# Patient Record
Sex: FEMALE | Race: BLACK OR AFRICAN AMERICAN | NOT HISPANIC OR LATINO | Employment: OTHER | URBAN - METROPOLITAN AREA
[De-identification: names, ages, dates, MRNs, and addresses within clinical notes are randomized per-mention and may not be internally consistent; named-entity substitution may affect disease eponyms.]

---

## 2023-03-15 ENCOUNTER — OFFICE VISIT (OUTPATIENT)
Dept: DERMATOLOGY | Facility: CLINIC | Age: 68
End: 2023-03-15
Payer: MEDICARE

## 2023-03-15 DIAGNOSIS — L60.3 ONYCHODYSTROPHY: ICD-10-CM

## 2023-03-15 DIAGNOSIS — L21.9 SEBORRHEIC DERMATITIS: Primary | ICD-10-CM

## 2023-03-15 DIAGNOSIS — L70.0 ACNE COMEDONE: ICD-10-CM

## 2023-03-15 DIAGNOSIS — L65.9 HAIR LOSS: ICD-10-CM

## 2023-03-15 DIAGNOSIS — B35.3 TINEA PEDIS OF BOTH FEET: ICD-10-CM

## 2023-03-15 PROCEDURE — 99999 PR PBB SHADOW E&M-NEW PATIENT-LVL III: CPT | Mod: PBBFAC,,, | Performed by: DERMATOLOGY

## 2023-03-15 PROCEDURE — 99204 PR OFFICE/OUTPT VISIT, NEW, LEVL IV, 45-59 MIN: ICD-10-PCS | Mod: 25,S$PBB,, | Performed by: DERMATOLOGY

## 2023-03-15 PROCEDURE — 99999 PR PBB SHADOW E&M-NEW PATIENT-LVL III: ICD-10-PCS | Mod: PBBFAC,,, | Performed by: DERMATOLOGY

## 2023-03-15 PROCEDURE — 99203 OFFICE O/P NEW LOW 30 MIN: CPT | Mod: PBBFAC | Performed by: DERMATOLOGY

## 2023-03-15 PROCEDURE — 99204 OFFICE O/P NEW MOD 45 MIN: CPT | Mod: 25,S$PBB,, | Performed by: DERMATOLOGY

## 2023-03-15 PROCEDURE — 10040 PR ACNE SURGERY OF SKIN ABSCESS: ICD-10-PCS | Mod: S$PBB,,, | Performed by: DERMATOLOGY

## 2023-03-15 PROCEDURE — 10040 EXTRACTION: CPT | Mod: PBBFAC | Performed by: DERMATOLOGY

## 2023-03-15 PROCEDURE — 10040 EXTRACTION: CPT | Mod: S$PBB,,, | Performed by: DERMATOLOGY

## 2023-03-15 RX ORDER — OMEPRAZOLE 40 MG/1
40 CAPSULE, DELAYED RELEASE ORAL DAILY
COMMUNITY
End: 2023-11-27

## 2023-03-15 RX ORDER — ECONAZOLE NITRATE 10 MG/G
CREAM TOPICAL
Qty: 85 G | Refills: 3 | Status: SHIPPED | OUTPATIENT
Start: 2023-03-15

## 2023-03-15 NOTE — PROGRESS NOTES
Subjective:       Patient ID:  Marissa Winn is a 67 y.o. female who presents for   Chief Complaint   Patient presents with    Seborrheic Dermatitis     Patient c/o itching drying in scalp, face and all over. Using Prednisone.    Spot     C/o black head /spot on left upper chest area.      Hx of seb derm    History of Present Illness: The patient presents with chief complaint of dermatitis.  Location: scalp  Duration: several years  Signs/Symptoms: scaling, dryness    Prior treatments: prednisone, selsen blue    She also c/o hair loss.  On nutrafol and biotin x 6 months.     She also c/o blackhead of the left shoulder.     She also c/o dryness of the feet        Review of Systems   Constitutional:  Negative for fever and chills.   Gastrointestinal:  Negative for nausea and vomiting.   Skin:  Positive for activity-related sunscreen use. Negative for daily sunscreen use and recent sunburn.   Hematologic/Lymphatic: Does not bruise/bleed easily.      Objective:    Physical Exam   Constitutional: She appears well-developed and well-nourished. No distress.   Neurological: She is alert and oriented to person, place, and time. She is not disoriented.   Psychiatric: She has a normal mood and affect.   Skin:   Areas Examined (abnormalities noted in diagram):   Head / Face Inspection Performed  Neck Inspection Performed  Chest / Axilla Inspection Performed  Back Inspection Performed  RUE Inspected  LUE Inspection Performed  Nails and Digits Inspection Performed                     Diagram Legend     Erythematous scaling macule/papule c/w actinic keratosis       Vascular papule c/w angioma      Pigmented verrucoid papule/plaque c/w seborrheic keratosis      Yellow umbilicated papule c/w sebaceous hyperplasia      Irregularly shaped tan macule c/w lentigo     1-2 mm smooth white papules consistent with Milia      Movable subcutaneous cyst with punctum c/w epidermal inclusion cyst      Subcutaneous movable cyst c/w pilar cyst       Firm pink to brown papule c/w dermatofibroma      Pedunculated fleshy papule(s) c/w skin tag(s)      Evenly pigmented macule c/w junctional nevus     Mildly variegated pigmented, slightly irregular-bordered macule c/w mildly atypical nevus      Flesh colored to evenly pigmented papule c/w intradermal nevus       Pink pearly papule/plaque c/w basal cell carcinoma      Erythematous hyperkeratotic cursted plaque c/w SCC      Surgical scar with no sign of skin cancer recurrence      Open and closed comedones      Inflammatory papules and pustules      Verrucoid papule consistent consistent with wart     Erythematous eczematous patches and plaques     Dystrophic onycholytic nail with subungual debris c/w onychomycosis     Umbilicated papule    Erythematous-base heme-crusted tan verrucoid plaque consistent with inflamed seborrheic keratosis     Erythematous Silvery Scaling Plaque c/w Psoriasis     See annotation      Assessment / Plan:        Seborrheic dermatitis  -     ketoconazole (NIZORAL) 2 % shampoo; Wash hair with medicated shampoo at least 1x/week - let sit on scalp at least 5 minutes prior to rinsing  Dispense: 120 mL; Refill: 11  -     discussed dx, will start above meds. Mild.    Hair loss  Mild hair loss, possible female pattern. Discussed OTC biotin and start of hairstim minoxidil.    Tinea pedis of both feet  -     econazole nitrate 1 % cream; AAA of feet bid  Dispense: 85 g; Refill: 3  -     of feet, will start above med.     Onychodystrophy  -     Ambulatory referral/consult to Podiatry; Future; Expected date: 03/22/2023  -     subungual hyperkeratosis, will refer to podiatry for debridement if warranted.     Acne comedone  Incision and expression of comedo contents performed today on the left shoulder with #11 blade and comedo extractor x 1 lesions. No complications.              Follow up in about 4 months (around 7/15/2023).

## 2023-03-16 ENCOUNTER — OFFICE VISIT (OUTPATIENT)
Dept: PODIATRY | Facility: CLINIC | Age: 68
End: 2023-03-16
Payer: MEDICARE

## 2023-03-16 VITALS — WEIGHT: 211.19 LBS | BODY MASS INDEX: 32.01 KG/M2 | HEIGHT: 68 IN

## 2023-03-16 DIAGNOSIS — L60.8: ICD-10-CM

## 2023-03-16 DIAGNOSIS — B35.3 TINEA PEDIS OF BOTH FEET: Primary | ICD-10-CM

## 2023-03-16 PROCEDURE — 99213 OFFICE O/P EST LOW 20 MIN: CPT | Mod: PBBFAC | Performed by: PODIATRIST

## 2023-03-16 PROCEDURE — 99203 PR OFFICE/OUTPT VISIT, NEW, LEVL III, 30-44 MIN: ICD-10-PCS | Mod: S$PBB,,, | Performed by: PODIATRIST

## 2023-03-16 PROCEDURE — 99203 OFFICE O/P NEW LOW 30 MIN: CPT | Mod: S$PBB,,, | Performed by: PODIATRIST

## 2023-03-16 PROCEDURE — 99999 PR PBB SHADOW E&M-EST. PATIENT-LVL III: CPT | Mod: PBBFAC,,, | Performed by: PODIATRIST

## 2023-03-16 PROCEDURE — 99999 PR PBB SHADOW E&M-EST. PATIENT-LVL III: ICD-10-PCS | Mod: PBBFAC,,, | Performed by: PODIATRIST

## 2023-03-16 RX ORDER — KETOCONAZOLE 20 MG/ML
SHAMPOO, SUSPENSION TOPICAL
Qty: 120 ML | Refills: 11 | Status: SHIPPED | OUTPATIENT
Start: 2023-03-16

## 2023-03-16 RX ORDER — TERBINAFINE HYDROCHLORIDE 250 MG/1
250 TABLET ORAL DAILY
Qty: 14 TABLET | Refills: 0 | Status: SHIPPED | OUTPATIENT
Start: 2023-03-16 | End: 2023-04-13 | Stop reason: SDUPTHER

## 2023-03-16 NOTE — PATIENT INSTRUCTIONS
Patient instructed to spray all shoes with Lysol disinfectant spray and let dry before wearing.   Patient instructed to wash all socks in warm water.

## 2023-03-16 NOTE — PROGRESS NOTES
"Subjective:     Patient ID: Marissa Winn is a 67 y.o. female.    Chief Complaint: Fungus (Pt c/o BL foot /nail fungus, pt c/o 0/10 pain,/PCP Dr. Catherine El last seen 2023)    Marissa is a 67 y.o. female who presents to the clinic complaining of growth of skin under left big toenail and nail fungus and skin fungus. Patient denies any pain to the toenail. Patient admits treatment for fungus years ago. Patient has no other pedal complaints at this time.     Referring Provider: Adina Ledesma MD    Patient Active Problem List   Diagnosis    Hypertension       Medication List with Changes/Refills   New Medications    TERBINAFINE HCL (LAMISIL) 250 MG TABLET    Take 1 tablet (250 mg total) by mouth once daily.   Current Medications    CETIRIZINE (ZYRTEC) 10 MG TABLET    Take 10 mg by mouth once daily.    ECONAZOLE NITRATE 1 % CREAM    AAA of feet bid    HYDROCHLOROTHIAZIDE (HYDRODIURIL) 25 MG TABLET    Take 25 mg by mouth once daily.    KETOCONAZOLE (NIZORAL) 2 % SHAMPOO    Wash hair with medicated shampoo at least 1x/week - let sit on scalp at least 5 minutes prior to rinsing    METFORMIN (GLUCOPHAGE) 500 MG TABLET    Take 500 mg by mouth daily with breakfast.    OMEPRAZOLE (PRILOSEC) 40 MG CAPSULE    Take 40 mg by mouth once daily.    PANTOPRAZOLE SODIUM (PANTOPRAZOLE ORAL)    Take by mouth.       Review of patient's allergies indicates:  No Known Allergies    History reviewed. No pertinent surgical history.    History reviewed. No pertinent family history.    Social History     Socioeconomic History    Marital status:    Tobacco Use    Smoking status: Never    Smokeless tobacco: Never       Vitals:    03/16/23 1448   Weight: 95.8 kg (211 lb 3.2 oz)   Height: 5' 7.5" (1.715 m)   PainSc: 0-No pain       Review of Systems   Constitutional:  Negative for chills and fever.   Cardiovascular: Negative.    Gastrointestinal:  Negative for diarrhea, nausea and vomiting.   Skin:  Positive for rash.   Neurological: Negative.  "   Endo/Heme/Allergies: Negative.    Psychiatric/Behavioral: Negative.             Objective:      PHYSICAL EXAM: Apperance: Alert and orient in no distress,well developed, and with good attention to grooming and body habits  Lower Extremity Exam  VASCULAR: Dorsalis pedis pulses 2/4 bilateral and Posterior Tibial pulses 2/4 bilateral. Capillary fill time <4 seconds bilateral. No edema observed bilateral. Varicosities absent bilateral. Skin temperature of the lower extremities is warm to warm, proximal to distal. Hair growth WNL bilateral.  DERMATOLOGICAL: No skin rash, subcutaneous nodules, lesions or ulcers observed. Dry and scaly skin noted plantarly bilateral(L>R) in moccasin distribution. Webspaces 1,2,3,4 bilateral are clean, dry and without evidence of break in skin integrity. Minimal overgrowth noted to distal left 1st and 2nd toes.   NEUROLOGICAL: Light touch, sharp-dull, proprioception all present and equal bilaterally.    MUSCULOSKELETAL: Muscle strength is 5/5 for foot inverters, everters, plantarflexors, and dorsiflexors. Muscle tone is normal.         Assessment:       ICD-10-CM ICD-9-CM   1. Tinea pedis of both feet  B35.3 110.4   2. Acquired pterygium of nail - Left Foot  L60.8 703.8       Plan:   Tinea pedis of both feet  -     terbinafine HCL (LAMISIL) 250 mg tablet; Take 1 tablet (250 mg total) by mouth once daily.  Dispense: 14 tablet; Refill: 0    Acquired pterygium of nail - Left Foot  -     Ambulatory referral/consult to Podiatry      I counseled the patient on her conditions, regarding findings of my examination, my impressions, and usual treatment plan.   Discuss treatment options for tinea pedis.  I explained that fungus lives in a warm dark moist environment and therefore patient should make every attempt to keep feet clean and dry.  We discussed drying feet thoroughly after shower particularly between the toes.   Patient instructed to spray all shoes with Lysol disinfectant spray and let  dry before wearing. Patient instructed to wash all socks in hot water and bleach.  We discussed using Lamisil for tinea pedis. This drug offers a fairly high but not universal cure rate. A 2-4 week treatment course is recommended. The patient is aware that rare cases of liver injury have been reported; and agrees to have a liver function tests performed. The symptoms of liver disease have been discussed; call if such occurs. Other side effects, such as headaches and rashes, have also been discussed.  Prescribed Lamisil 250mg to be taken once daily with food. Patient was instructed on dosing information. Discontinue if adverse effects occur   Patient instructed to apply Econazole cream to plantar feet once daily.   Patient to return in 4 weeks or sooner if needed.          Haroldo Gibson DPM  [facility]

## 2023-03-24 ENCOUNTER — TELEPHONE (OUTPATIENT)
Dept: DERMATOLOGY | Facility: CLINIC | Age: 68
End: 2023-03-24
Payer: MEDICARE

## 2023-03-24 NOTE — TELEPHONE ENCOUNTER
Attempted to call patient to inform them about Dr. Ledesma sending over hairstim minoxidil; no answer at this time. LVM instructing patient to call back.     ----- Message from Adina Ledesma MD sent at 3/23/2023  6:07 PM CDT -----  Will send hair stim minoxidil, they will contact pt directly.  ----- Message -----  From: Heidy Oliver MA  Sent: 3/23/2023   2:22 PM CDT  To: Adina Ledesma MD    I am unsure of which hair loss company.  ----- Message -----  From: Rohan Bunn  Sent: 3/23/2023   1:49 PM CDT  To: [facility] Staff    Pt is calling in regards to consulting with a nurse    She stated she was told about a hair loss company by the office last visit and she needed more info      Pls call her back at [phone]        Thank you    Tammy

## 2023-03-24 NOTE — TELEPHONE ENCOUNTER
Spoke with patient, msg was sent to Dr. Ledesma.    ---- Message from Maame Gore sent at 3/24/2023 10:30 AM CDT -----  Contact: [phone]  Pt is requesting a call in regards to medication. Pt stated that the company for the hair loss/growth has never contacted her and she is needing to know how to proceed. Please call pt back at [phone]. Thanks KB

## 2023-03-27 ENCOUNTER — TELEPHONE (OUTPATIENT)
Dept: DERMATOLOGY | Facility: CLINIC | Age: 68
End: 2023-03-27
Payer: MEDICARE

## 2023-03-28 ENCOUNTER — TELEPHONE (OUTPATIENT)
Dept: DERMATOLOGY | Facility: CLINIC | Age: 68
End: 2023-03-28
Payer: MEDICARE

## 2023-03-28 NOTE — TELEPHONE ENCOUNTER
Returned pt call. Pt informed that medication was sent on 3/23 and that they do not have an email but will try and call her mobile number. Verified number with pt. Pt also given email for company to reach out. She reports not receiving any call yet.   ----- Message from Natividad Boston sent at 3/27/2023  5:13 PM CDT -----  Type: General Call Back     Name of Caller:NENA PERRIN [[id number]]  Symptoms: missed call back   Would the patient rather a call back or a response via [facility]? Call back   Best Call Back Number:[phone]  Additional Information: Patient indicates she would like to receive a call back with more information regarding the missed call. Patient indicates she does not know what she was trying to be contacted for and would like to know what it was regarding. Please call patient back with further assistance and more information.

## 2023-04-13 ENCOUNTER — OFFICE VISIT (OUTPATIENT)
Dept: PODIATRY | Facility: CLINIC | Age: 68
End: 2023-04-13
Payer: MEDICARE

## 2023-04-13 ENCOUNTER — HOSPITAL ENCOUNTER (OUTPATIENT)
Dept: RADIOLOGY | Facility: HOSPITAL | Age: 68
Discharge: HOME | End: 2023-04-13
Attending: PODIATRIST
Payer: MEDICARE

## 2023-04-13 VITALS — HEIGHT: 67 IN | WEIGHT: 211 LBS | BODY MASS INDEX: 33.12 KG/M2

## 2023-04-13 DIAGNOSIS — M72.2 PLANTAR FASCIITIS: ICD-10-CM

## 2023-04-13 DIAGNOSIS — M79.672 LEFT FOOT PAIN: ICD-10-CM

## 2023-04-13 DIAGNOSIS — B35.3 TINEA PEDIS OF BOTH FEET: Primary | ICD-10-CM

## 2023-04-13 PROCEDURE — 99213 PR OFFICE/OUTPT VISIT, EST, LEVL III, 20-29 MIN: ICD-10-PCS | Mod: S$PBB,,, | Performed by: PODIATRIST

## 2023-04-13 PROCEDURE — 99999 PR PBB SHADOW E&M-EST. PATIENT-LVL III: ICD-10-PCS | Mod: PBBFAC,,, | Performed by: PODIATRIST

## 2023-04-13 PROCEDURE — 73630 X-RAY EXAM OF FOOT: CPT | Mod: 26,50,, | Performed by: RADIOLOGY

## 2023-04-13 PROCEDURE — 99213 OFFICE O/P EST LOW 20 MIN: CPT | Mod: S$PBB,,, | Performed by: PODIATRIST

## 2023-04-13 PROCEDURE — 73630 XR FOOT COMPLETE 3 VIEW BILATERAL: ICD-10-PCS | Mod: 26,50,, | Performed by: RADIOLOGY

## 2023-04-13 PROCEDURE — 99999 PR PBB SHADOW E&M-EST. PATIENT-LVL III: CPT | Mod: PBBFAC,,, | Performed by: PODIATRIST

## 2023-04-13 PROCEDURE — 73630 X-RAY EXAM OF FOOT: CPT | Mod: TC,50

## 2023-04-13 PROCEDURE — 99213 OFFICE O/P EST LOW 20 MIN: CPT | Mod: PBBFAC | Performed by: PODIATRIST

## 2023-04-13 RX ORDER — TERBINAFINE HYDROCHLORIDE 250 MG/1
250 TABLET ORAL DAILY
Qty: 14 TABLET | Refills: 0 | Status: SHIPPED | OUTPATIENT
Start: 2023-04-13 | End: 2023-11-27

## 2023-04-13 NOTE — PROGRESS NOTES
Subjective:     Patient ID: Marissa Winn is a 67 y.o. female.    Chief Complaint: Follow-up (Follow up on fungus, diabetic pt, no pain, pt is wearing slippers, Pt was last seen in Dec. By PCP Dr. Catherine El )    Marissa is a 67 y.o. female who presents to the clinic for follow up of fungus. Patient states the oral medication helped immediately. Patient states she started the cream and it helps also. Patient also complains of right heel pain that comes and goes. Patient also complains of a stress fracture in the left foot from last year that sometimes hurts.  Patient has no other pedal complaints at this time.       Patient Active Problem List   Diagnosis    Hypertension       Medication List with Changes/Refills   Current Medications    CETIRIZINE (ZYRTEC) 10 MG TABLET    Take 10 mg by mouth once daily.    ECONAZOLE NITRATE 1 % CREAM    AAA of feet bid    HYDROCHLOROTHIAZIDE (HYDRODIURIL) 25 MG TABLET    Take 25 mg by mouth once daily.    KETOCONAZOLE (NIZORAL) 2 % SHAMPOO    Wash hair with medicated shampoo at least 1x/week - let sit on scalp at least 5 minutes prior to rinsing    METFORMIN (GLUCOPHAGE) 500 MG TABLET    Take 500 mg by mouth daily with breakfast.    OMEPRAZOLE (PRILOSEC) 40 MG CAPSULE    Take 40 mg by mouth once daily.    PANTOPRAZOLE SODIUM (PANTOPRAZOLE ORAL)    Take by mouth.   Changed and/or Refilled Medications    Modified Medication Previous Medication    TERBINAFINE HCL (LAMISIL) 250 MG TABLET terbinafine HCL (LAMISIL) 250 mg tablet       Take 1 tablet (250 mg total) by mouth once daily.    Take 1 tablet (250 mg total) by mouth once daily.       Review of patient's allergies indicates:  No Known Allergies    History reviewed. No pertinent surgical history.    History reviewed. No pertinent family history.    Social History     Socioeconomic History    Marital status:    Tobacco Use    Smoking status: Never    Smokeless tobacco: Never       Vitals:    04/13/23 1039   Weight: 95.7 kg  "(211 lb)   Height: 5' 7" (1.702 m)   PainSc: 0-No pain       Review of Systems   Constitutional:  Negative for chills and fever.   Cardiovascular: Negative.    Gastrointestinal:  Negative for diarrhea, nausea and vomiting.   Skin:  Positive for rash.   Neurological: Negative.    Endo/Heme/Allergies: Negative.    Psychiatric/Behavioral: Negative.             Objective:      PHYSICAL EXAM: Apperance: Alert and orient in no distress,well developed, and with good attention to grooming and body habits  Lower Extremity Exam  VASCULAR: Dorsalis pedis pulses 2/4 bilateral and Posterior Tibial pulses 2/4 bilateral. Capillary fill time <4 seconds bilateral. No edema observed bilateral. Varicosities absent bilateral. Skin temperature of the lower extremities is warm to warm, proximal to distal. Hair growth WNL bilateral.  DERMATOLOGICAL: No skin rash, subcutaneous nodules, lesions or ulcers observed. Minimal dry and scaly skin noted plantarly bilateral(L>R) in moccasin distribution. Webspaces 1,2,3,4 bilateral are clean, dry and without evidence of break in skin integrity. Minimal overgrowth noted to distal left 1st and 2nd toes.   NEUROLOGICAL: Light touch, sharp-dull, proprioception all present and equal bilaterally.    MUSCULOSKELETAL: Muscle strength is 5/5 for foot inverters, everters, plantarflexors, and dorsiflexors. Muscle tone is normal. Ankle joints right shows decreased ROM. right ankle ROM shows greater decrease in dorsiflexion with knee extended. Ankle joint ROM is pain free and without crepitus right. No pain to palpation right plantar medial tubercle. Plantar medial aspect of right heels shows tenderness to palpation. No pain on medial-lateral compression of the calcaneus.         Assessment:       ICD-10-CM ICD-9-CM   1. Tinea pedis of both feet  B35.3 110.4   2. Plantar fasciitis - Right Foot  M72.2 728.71   3. Left foot pain - Left Foot  M79.672 729.5         Plan:   Tinea pedis of both feet  -     " terbinafine HCL (LAMISIL) 250 mg tablet; Take 1 tablet (250 mg total) by mouth once daily.  Dispense: 14 tablet; Refill: 0    Plantar fasciitis - Right Foot  -     X-Ray Foot Complete Bilateral; Future; Expected date: 04/13/2023    Left foot pain - Left Foot  -     X-Ray Foot Complete Bilateral; Future; Expected date: 04/13/2023        I counseled the patient on her conditions, regarding findings of my examination, my impressions, and usual treatment plan.   Discuss treatment options for tinea pedis.  I explained that fungus lives in a warm dark moist environment and therefore patient should make every attempt to keep feet clean and dry.  We discussed drying feet thoroughly after shower particularly between the toes.   Patient instructed to spray all shoes with Lysol disinfectant spray and let dry before wearing. Patient instructed to wash all socks in hot water and bleach.  We discussed using Lamisil for tinea pedis. This drug offers a fairly high but not universal cure rate. A 2-4 week treatment course is recommended. The patient is aware that rare cases of liver injury have been reported; and agrees to have a liver function tests performed. The symptoms of liver disease have been discussed; call if such occurs. Other side effects, such as headaches and rashes, have also been discussed.  Prescribed Lamisil 250mg to be taken once daily with food. Patient was instructed on dosing information. Discontinue if adverse effects occur   Patient instructed to apply Econazole cream to plantar feet once daily.   Ordered bilateral foot x-rays to be completed today.   I explained to the patient that etiology and treatment options for heel pain including rest,  ice messages, stretching exercises, strappings/tappings, NSAID's, injections, new shoegear with orthotic inserts, and/or surgical treatment.   Patient agreed to stretching exercises.   I gave written and verbal instructions on heel cord stretching and this was  demonstrated for the patient. Patient expressed understanding.  The patient and I reviewed the types of shoes she should be wearing, my recommendation includes generally the best time of the day for a shoe fitting is the afternoon, shoes with a wide toe box, very good cushion, and tennis shoes with removable inner soles. The patient and I reviewed my recommendations for over-the-counter orthotic inserts.   Patient to return as needed.          Haroldo Gibson DPM  [facility]

## 2023-06-15 ENCOUNTER — OFFICE VISIT (OUTPATIENT)
Dept: DERMATOLOGY | Facility: CLINIC | Age: 68
End: 2023-06-15
Payer: MEDICARE

## 2023-06-15 VITALS — BODY MASS INDEX: 33.12 KG/M2 | HEIGHT: 67 IN | WEIGHT: 211 LBS

## 2023-06-15 DIAGNOSIS — L65.9 HAIR LOSS: Primary | ICD-10-CM

## 2023-06-15 DIAGNOSIS — L65.8 TRACTION ALOPECIA: ICD-10-CM

## 2023-06-15 PROCEDURE — 99214 OFFICE O/P EST MOD 30 MIN: CPT | Mod: S$PBB,,, | Performed by: DERMATOLOGY

## 2023-06-15 PROCEDURE — 99999 PR PBB SHADOW E&M-EST. PATIENT-LVL III: CPT | Mod: PBBFAC,,, | Performed by: DERMATOLOGY

## 2023-06-15 PROCEDURE — 99214 PR OFFICE/OUTPT VISIT, EST, LEVL IV, 30-39 MIN: ICD-10-PCS | Mod: S$PBB,,, | Performed by: DERMATOLOGY

## 2023-06-15 PROCEDURE — 99999 PR PBB SHADOW E&M-EST. PATIENT-LVL III: ICD-10-PCS | Mod: PBBFAC,,, | Performed by: DERMATOLOGY

## 2023-06-15 PROCEDURE — 99213 OFFICE O/P EST LOW 20 MIN: CPT | Mod: PBBFAC | Performed by: DERMATOLOGY

## 2023-06-15 RX ORDER — FLUOCINOLONE ACETONIDE 0.1 MG/ML
SOLUTION TOPICAL
Qty: 60 ML | Refills: 5 | Status: SHIPPED | OUTPATIENT
Start: 2023-06-15

## 2023-06-15 NOTE — PROGRESS NOTES
Subjective:      Patient ID:  Marissa Winn is a 68 y.o. female who presents for   Chief Complaint   Patient presents with    Follow-up    Hair Loss     Hx of seb derm, hair loss, onychodystrophy, and tinea pedis, last seen on 3/15/23.She states scalp did not improve with ketoconazole shampoo, restarted OTC nizoral.  She c/o increased shedding of the frontal hairline.  Did not start hairstim product.       Review of Systems   Constitutional:  Negative for fever and chills.   Gastrointestinal:  Negative for nausea and vomiting.   Skin:  Positive for activity-related sunscreen use. Negative for daily sunscreen use and recent sunburn.   Hematologic/Lymphatic: Does not bruise/bleed easily.     Objective:   Physical Exam   Constitutional: She appears well-developed and well-nourished. No distress.   Neurological: She is alert and oriented to person, place, and time. She is not disoriented.   Psychiatric: She has a normal mood and affect.   Skin:   Areas Examined (abnormalities noted in diagram):   Scalp / Hair Palpated and Inspected  Head / Face Inspection Performed  Neck Inspection Performed  RUE Inspected  LUE Inspection Performed  Nails and Digits Inspection Performed            Assessment / Plan:        Hair loss  Traction alopecia  -     fluocinolone (SYNALAR) 0.01 % external solution; Use once daily on scalp  Dispense: 60 mL; Refill: 5  -     mild to moderate thinning noted.  Will start hairstim minoxidil/finasteride. Will adda brady med, continue nutrafol daily.              Follow up in about 3 months (around 9/15/2023).

## 2023-11-27 ENCOUNTER — OFFICE VISIT (OUTPATIENT)
Dept: PODIATRY | Facility: CLINIC | Age: 68
End: 2023-11-27
Payer: MEDICARE

## 2023-11-27 VITALS — BODY MASS INDEX: 32.96 KG/M2 | WEIGHT: 210 LBS | HEIGHT: 67 IN

## 2023-11-27 DIAGNOSIS — L60.0 ONYCHOCRYPTOSIS: Primary | ICD-10-CM

## 2023-11-27 PROCEDURE — 99213 OFFICE O/P EST LOW 20 MIN: CPT | Mod: PBBFAC | Performed by: PODIATRIST

## 2023-11-27 PROCEDURE — 99999 PR PBB SHADOW E&M-EST. PATIENT-LVL III: CPT | Mod: PBBFAC,,, | Performed by: PODIATRIST

## 2023-11-27 PROCEDURE — 99999 PR PBB SHADOW E&M-EST. PATIENT-LVL III: ICD-10-PCS | Mod: PBBFAC,,, | Performed by: PODIATRIST

## 2023-11-27 PROCEDURE — 99213 PR OFFICE/OUTPT VISIT, EST, LEVL III, 20-29 MIN: ICD-10-PCS | Mod: S$PBB,,, | Performed by: PODIATRIST

## 2023-11-27 PROCEDURE — 99213 OFFICE O/P EST LOW 20 MIN: CPT | Mod: S$PBB,,, | Performed by: PODIATRIST

## 2023-11-27 RX ORDER — IRBESARTAN 75 MG/1
75 TABLET ORAL
COMMUNITY

## 2023-11-27 RX ORDER — GABAPENTIN 100 MG/1
200 CAPSULE ORAL
COMMUNITY
Start: 2023-07-14

## 2023-11-27 RX ORDER — EMPAGLIFLOZIN 25 MG/1
1 TABLET, FILM COATED ORAL EVERY MORNING
COMMUNITY
Start: 2023-07-14

## 2023-11-27 RX ORDER — VALACYCLOVIR HYDROCHLORIDE 500 MG/1
500 TABLET, FILM COATED ORAL 2 TIMES DAILY
COMMUNITY

## 2023-11-27 RX ORDER — TIRZEPATIDE 2.5 MG/.5ML
2.5 INJECTION, SOLUTION SUBCUTANEOUS
COMMUNITY
Start: 2023-11-06

## 2023-11-27 NOTE — PROGRESS NOTES
Subjective:     Patient ID: Marissa Winn is a 68 y.o. female.    Chief Complaint: Foot Pain (Left great toe pain, she states pain on the medial side of toe, pt states the the toe has been sensitive on that side for years but, now its progress on the pain like on and off, pt rates pain 1/10, pt is non-diabetic)    Marissa is a 68 y.o. female who presents to the clinic complaining of painful ingrown toenail on the left foot. Patient points to left medial hallux nail. Patient states pain has been on and off for years. Patient rates pain 1/10. Patient has no other pedal complaint at this time.     Patient Active Problem List   Diagnosis    Hypertension       Medication List with Changes/Refills   Current Medications    CETIRIZINE (ZYRTEC) 10 MG TABLET    Take 10 mg by mouth once daily.    ECONAZOLE NITRATE 1 % CREAM    AAA of feet bid    FLUOCINOLONE (SYNALAR) 0.01 % EXTERNAL SOLUTION    Use once daily on scalp    GABAPENTIN (NEURONTIN) 100 MG CAPSULE    Take 200 mg by mouth.    HYDROCHLOROTHIAZIDE (HYDRODIURIL) 25 MG TABLET    Take 25 mg by mouth once daily.    IRBESARTAN (AVAPRO) 75 MG TABLET    Take 75 mg by mouth.    JARDIANCE 25 MG TABLET    Take 1 tablet by mouth every morning.    KETOCONAZOLE (NIZORAL) 2 % SHAMPOO    Wash hair with medicated shampoo at least 1x/week - let sit on scalp at least 5 minutes prior to rinsing    METFORMIN (GLUCOPHAGE) 500 MG TABLET    Take 500 mg by mouth daily with breakfast.    MOUNJARO 2.5 MG/0.5 ML PNIJ    Inject 2.5 mg into the skin.    VALACYCLOVIR (VALTREX) 500 MG TABLET    Take 500 mg by mouth 2 (two) times daily.   Discontinued Medications    OMEPRAZOLE (PRILOSEC) 40 MG CAPSULE    Take 40 mg by mouth once daily.    PANTOPRAZOLE SODIUM (PANTOPRAZOLE ORAL)    Take by mouth.    TERBINAFINE HCL (LAMISIL) 250 MG TABLET    Take 1 tablet (250 mg total) by mouth once daily.       Review of patient's allergies indicates:  No Known Allergies    History reviewed. No pertinent surgical  "history.    History reviewed. No pertinent family history.    Social History     Socioeconomic History    Marital status:    Tobacco Use    Smoking status: Never    Smokeless tobacco: Never       Vitals:    11/27/23 1051   Weight: 95.3 kg (210 lb)   Height: 5' 7" (1.702 m)   PainSc:   1       Review of Systems   Constitutional:  Negative for chills and fever.   Respiratory:  Negative for shortness of breath.    Cardiovascular:  Negative for chest pain, palpitations, orthopnea, claudication and leg swelling.   Gastrointestinal:  Negative for diarrhea, nausea and vomiting.   Musculoskeletal:  Negative for joint pain.   Skin:  Negative for rash.   Neurological:  Negative for dizziness, tingling, sensory change, focal weakness and weakness.   Psychiatric/Behavioral: Negative.           Objective:      PHYSICAL EXAM: Apperance: Alert and orient in no distress,well developed, and with good attention to grooming and body habits  Lower Extremity Exam   VASCULAR: Dorsalis pedis pulses 2/4 bilateral and Posterior Tibial pulses 2/4 bilateral.   DERMATOLOGICAL: No skin rash, subcutaneous nodules, lesions or ulcers observed. Left hallux nail observed to be mildly incurvated at  distal medial  borders and slight obstructed in the nail grooves with soft tissue. No purulent drainage, no odor, and no increased temperature observed to left hallux.    NEUROLOGICAL: Light touch, sharp-dull, proprioception all present and equal bilaterally.    MUSCULOSKELETAL: Muscle strength 5/5 for all foot inverters, everters, plantarflexors, and  dorsiflexors bilateral. Pain on palpation of left hallux  distal medial  nail border. No pain on palpation of dorsal nail plate left hallux.          Assessment:       ICD-10-CM ICD-9-CM   1. Onychocryptosis - Left Foot  L60.0 703.0       Plan:   Onychocryptosis - Left Foot      I counseled the patient on her conditions, regarding findings of my examination, my impressions, and usual treatment plan. "   Conservatively we did discuss proper nail cutting for incurvated toenails. Surgically we briefly discussed temporary vs. permanent nail avulsion procedures with patient. The patient elects for conservative management at this time.   Patient to return as needed.          Haroldo Gibson DPM  [facility]

## 2024-01-03 ENCOUNTER — TELEPHONE (OUTPATIENT)
Dept: PODIATRY | Facility: CLINIC | Age: 69
End: 2024-01-03
Payer: MEDICARE

## 2024-01-03 NOTE — TELEPHONE ENCOUNTER
----- Message from Miranda Isaac sent at 1/2/2024  4:15 PM CST -----  Name of Who is Calling:pt           What is the request in detail:requesting a call to check for sooner availability           Can the clinic reply by [facility]:  no         What Number to Call Back if not in [facility]: [phone]

## 2024-01-23 ENCOUNTER — OFFICE VISIT (OUTPATIENT)
Dept: PODIATRY | Facility: CLINIC | Age: 69
End: 2024-01-23
Payer: MEDICARE

## 2024-01-23 ENCOUNTER — PATIENT MESSAGE (OUTPATIENT)
Dept: PODIATRY | Facility: CLINIC | Age: 69
End: 2024-01-23

## 2024-01-23 VITALS — BODY MASS INDEX: 32.98 KG/M2 | WEIGHT: 210.13 LBS | HEIGHT: 67 IN

## 2024-01-23 DIAGNOSIS — M19.072 OSTEOARTHRITIS OF LEFT ANKLE AND FOOT: Primary | ICD-10-CM

## 2024-01-23 PROCEDURE — 99213 OFFICE O/P EST LOW 20 MIN: CPT | Mod: 25,S$PBB,, | Performed by: PODIATRIST

## 2024-01-23 PROCEDURE — 20605 DRAIN/INJ JOINT/BURSA W/O US: CPT | Mod: PBBFAC,LT | Performed by: PODIATRIST

## 2024-01-23 PROCEDURE — 99999 PR PBB SHADOW E&M-EST. PATIENT-LVL III: CPT | Mod: PBBFAC,,, | Performed by: PODIATRIST

## 2024-01-23 PROCEDURE — 99999PBSHW PR PBB SHADOW TECHNICAL ONLY FILED TO HB: Mod: PBBFAC,,,

## 2024-01-23 PROCEDURE — 99213 OFFICE O/P EST LOW 20 MIN: CPT | Mod: PBBFAC,25 | Performed by: PODIATRIST

## 2024-01-23 PROCEDURE — 20605 DRAIN/INJ JOINT/BURSA W/O US: CPT | Mod: S$PBB,LT,, | Performed by: PODIATRIST

## 2024-01-23 RX ADMIN — TRIAMCINOLONE ACETONIDE 40 MG: 200 INJECTION, SUSPENSION INTRA-ARTICULAR; INTRAMUSCULAR at 02:01

## 2024-01-23 NOTE — PROGRESS NOTES
Subjective:     Patient ID: Marissa Winn is a 68 y.o. female.    Chief Complaint: Foot Pain (C/o states left foot pain on top of foot,hurts more when walking, started 4 weeks ago, rates pain 7/10, wearing casual shoes, diabetic pt, last seen 11/06/23. )    Marissa is a 68 y.o. female who presents to the podiatry clinic  with complaint of  left foot pain. Onset of the symptoms was several weeks ago. Precipitating event: none known. Current symptoms include: ability to bear weight, but with some pain and stiffness. Aggravating factors: walking. Symptoms have been intermittent. Patient has had prior foot problems. Evaluation to date: none. Treatment to date: rest. Patients rates pain 7/10 on pain scale. Patient points to left dorsal midfoot.    Patient Active Problem List   Diagnosis    Hypertension       Medication List with Changes/Refills   Current Medications    CETIRIZINE (ZYRTEC) 10 MG TABLET    Take 10 mg by mouth once daily.    ECONAZOLE NITRATE 1 % CREAM    AAA of feet bid    FLUOCINOLONE (SYNALAR) 0.01 % EXTERNAL SOLUTION    Use once daily on scalp    GABAPENTIN (NEURONTIN) 100 MG CAPSULE    Take 200 mg by mouth.    HYDROCHLOROTHIAZIDE (HYDRODIURIL) 25 MG TABLET    Take 25 mg by mouth once daily.    IRBESARTAN (AVAPRO) 75 MG TABLET    Take 75 mg by mouth.    JARDIANCE 25 MG TABLET    Take 1 tablet by mouth every morning.    KETOCONAZOLE (NIZORAL) 2 % SHAMPOO    Wash hair with medicated shampoo at least 1x/week - let sit on scalp at least 5 minutes prior to rinsing    METFORMIN (GLUCOPHAGE) 500 MG TABLET    Take 500 mg by mouth daily with breakfast.    MOUNJARO 2.5 MG/0.5 ML PNIJ    Inject 2.5 mg into the skin.    VALACYCLOVIR (VALTREX) 500 MG TABLET    Take 500 mg by mouth 2 (two) times daily.       Review of patient's allergies indicates:  No Known Allergies    History reviewed. No pertinent surgical history.    History reviewed. No pertinent family history.    Social History     Socioeconomic History     "Marital status:    Tobacco Use    Smoking status: Never    Smokeless tobacco: Never       Vitals:    01/23/24 1420   Weight: 95.3 kg (210 lb 1.6 oz)   Height: 5' 7" (1.702 m)   PainSc:   7     Review of Systems   Constitutional:  Negative for chills and fever.   Respiratory:  Negative for shortness of breath.    Cardiovascular:  Negative for chest pain, palpitations, orthopnea, claudication and leg swelling.   Gastrointestinal:  Negative for diarrhea, nausea and vomiting.   Musculoskeletal:  Positive for joint pain (left midfoot).   Skin:  Negative for rash.   Neurological:  Negative for dizziness, tingling, sensory change, focal weakness and weakness.   Psychiatric/Behavioral: Negative.               Objective:      PHYSICAL EXAM: Apperance: Alert and orient in no distress,well developed, and with good attention to grooming and body habits  Lower Extremity Exam   VASCULAR: Dorsalis pedis pulses 2/4 bilateral and Posterior Tibial pulses 2/4 bilateral.   DERMATOLOGICAL: No skin rash, subcutaneous nodules, lesions or ulcers observed.   NEUROLOGICAL: Light touch, sharp-dull, proprioception all present and equal bilaterally.    MUSCULOSKELETAL: Muscle strength 5/5 for all foot inverters, everters, plantarflexors, and  dorsiflexors bilateral. Pain on palpation of dorsal left midfoot.           Assessment:       ICD-10-CM ICD-9-CM   1. Osteoarthritis of left ankle and foot  M19.072 715.97       Plan:   Osteoarthritis of left ankle and foot  -     triamcinolone acetonide injection 40 mg      I counseled the patient on her conditions, regarding findings of my examination, my impressions, and usual treatment plan.   Patient agreed to injection therapy today. After sterilizing the area of left midfoot with an alcohol prep, the affected area was injected with a solution containing 1 cc of 1% lidocaine plain, 1cc of 0.5% Marcaine plain and 1 cc of Kenalog 40%.  Injection area was then covered with band-aid. The patient " tolerated the injection well and reported comfort to the area.  The patient and I reviewed the types of shoes she should be wearing, my recommendation includes generally the best time of the day for a shoe fitting is the afternoon, shoes with a wide toe box, very good cushion, and tennis shoes with removable inner soles.The patient and I reviewed my recommendations for over-the-counter orthotic inserts.   Patient to return if symptoms persist or worsen.            Haroldo Gibson DPM  [facility]

## 2024-02-04 RX ORDER — TRIAMCINOLONE ACETONIDE 40 MG/ML
40 INJECTION, SUSPENSION INTRA-ARTICULAR; INTRAMUSCULAR
Status: COMPLETED | OUTPATIENT
Start: 2024-01-23 | End: 2024-01-23

## 2024-04-29 ENCOUNTER — OFFICE VISIT (OUTPATIENT)
Dept: PODIATRY | Facility: CLINIC | Age: 69
End: 2024-04-29
Payer: MEDICARE

## 2024-04-29 VITALS — WEIGHT: 210.13 LBS | BODY MASS INDEX: 32.98 KG/M2 | HEIGHT: 67 IN

## 2024-04-29 DIAGNOSIS — M19.072 OSTEOARTHRITIS OF LEFT ANKLE AND FOOT: Primary | ICD-10-CM

## 2024-04-29 DIAGNOSIS — B35.3 TINEA PEDIS OF BOTH FEET: ICD-10-CM

## 2024-04-29 DIAGNOSIS — L81.9 HYPOPIGMENTATION: ICD-10-CM

## 2024-04-29 PROCEDURE — 99213 OFFICE O/P EST LOW 20 MIN: CPT | Mod: PBBFAC | Performed by: PODIATRIST

## 2024-04-29 PROCEDURE — 99214 OFFICE O/P EST MOD 30 MIN: CPT | Mod: S$PBB,,, | Performed by: PODIATRIST

## 2024-04-29 PROCEDURE — 99999 PR PBB SHADOW E&M-EST. PATIENT-LVL III: CPT | Mod: PBBFAC,,, | Performed by: PODIATRIST

## 2024-04-29 RX ORDER — KETOCONAZOLE 20 MG/G
CREAM TOPICAL 2 TIMES DAILY
Qty: 30 G | Refills: 2 | Status: SHIPPED | OUTPATIENT
Start: 2024-04-29

## 2024-04-29 NOTE — PROGRESS NOTES
Podiatry Department    Patient ID: Marissa Winn is a 68 y.o. female.    Chief Complaint: Foot Problem (C/o lump on top of LT foot, pt states its been like this for a few ago, pt states she went to dr. Gibson and she told her to stop wearing the type of sandal and it popped up, pt rates pain 0/10, pt is non-diabetic/)    History of Present Illness    CHIEF COMPLAINT:  Patient presents today for follow-up on foot issues.    FOOT CONCERNS:  Patient reports recent itchiness on her right foot and the presence of a discolored, hypopigmented bump. She associates this bump with a steroid injection received in January for arthritis treatment. She also notes dry, scaly skin present on her left foot, but denies itchiness or other issues with the left foot.    ARTHRITIS HISTORY:  Patient had a previous diagnosis of arthritis in the right foot but denies any current associated pain.    SKIN CARE ROUTINE:  Patient uses a pumice stone regularly for foot care and plans to continue using Urea cream indefinitely for dry skin on her right heel. She will fill a prescription for Ketoconazole if she does not have it at home.    ROS:  Musculoskeletal: -joint pain  Skin: +itching            Physical Exam    Musculoskeletal: Dorsal prominence in midfoot.  Skin: Scales in moccasin distribution on left foot.     Hypopigmentation noted midfoot left on injection site      Diagnoses:  1. Osteoarthritis of left ankle and foot    2. Hypopigmentation    3. Tinea pedis of both feet    Other orders  -     ketoconazole (NIZORAL) 2 % cream; Apply topically 2 (two) times daily. On bottom of feet for two weeks  Dispense: 30 g; Refill: 2        Assessment & Plan    DRY SKIN:  - Prescribed urea cream for the patient's dry skin, to be applied twice daily indefinitely.  - Recommend the use of a pumice stone once every 1-2 weeks to manage the dry skin on the heels.  - Further clarified the benefits of using a pumice stone in managing dry skin.  POTENTIAL  FUNGAL INFECTION:  - For the potential fungal infection on the patient's feet, prescribed ketoconazole, to be applied twice daily for 2 weeks.  - Explained the causes of the dry skin and potential fungal infection, and the role of the prescribed medications in treatment.  STEROID INJECTION EFFECTS:  - Educated the patient on the effects of the steroid injection, including hypopigmentation. Reassurance provided.        Future Appointments   Date Time Provider Department [place]   7/2/2024  9:30 AM León Pickering MD [facility]        This note was generated with the assistance of ambient listening technology. Verbal consent was obtained by the patient and accompanying visitor(s) for the recording of patient appointment to facilitate this note. I attest to having reviewed and edited the generated note for accuracy, though some syntax or spelling errors may persist. Please contact the author of this note for any clarification.      Report Electronically Signed By:     Karissa Butts DPM   Podiatry  [facility]  5/7/2024

## 2024-07-31 ENCOUNTER — PATIENT MESSAGE (OUTPATIENT)
Dept: RESEARCH | Facility: HOSPITAL | Age: 69
End: 2024-07-31
Payer: MEDICARE

## 2025-02-10 ENCOUNTER — OFFICE VISIT (OUTPATIENT)
Dept: PODIATRY | Facility: CLINIC | Age: 70
End: 2025-02-10
Payer: MEDICARE

## 2025-02-10 ENCOUNTER — HOSPITAL ENCOUNTER (OUTPATIENT)
Dept: RADIOLOGY | Facility: HOSPITAL | Age: 70
Discharge: HOME | End: 2025-02-10
Attending: PODIATRIST
Payer: MEDICARE

## 2025-02-10 VITALS — HEIGHT: 67 IN | WEIGHT: 210.13 LBS | BODY MASS INDEX: 32.98 KG/M2

## 2025-02-10 DIAGNOSIS — M19.072 OSTEOARTHRITIS OF LEFT ANKLE AND FOOT: Primary | ICD-10-CM

## 2025-02-10 DIAGNOSIS — M19.072 OSTEOARTHRITIS OF LEFT ANKLE AND FOOT: ICD-10-CM

## 2025-02-10 PROCEDURE — 99213 OFFICE O/P EST LOW 20 MIN: CPT | Mod: S$PBB,,, | Performed by: PODIATRIST

## 2025-02-10 PROCEDURE — 99213 OFFICE O/P EST LOW 20 MIN: CPT | Mod: PBBFAC,25 | Performed by: PODIATRIST

## 2025-02-10 PROCEDURE — 73630 X-RAY EXAM OF FOOT: CPT | Mod: 26,LT,, | Performed by: STUDENT IN AN ORGANIZED HEALTH CARE EDUCATION/TRAINING PROGRAM

## 2025-02-10 PROCEDURE — 73630 X-RAY EXAM OF FOOT: CPT | Mod: TC,LT

## 2025-02-10 PROCEDURE — 99999 PR PBB SHADOW E&M-EST. PATIENT-LVL III: CPT | Mod: PBBFAC,,, | Performed by: PODIATRIST

## 2025-02-10 RX ORDER — PREDNISONE 20 MG/1
20 TABLET ORAL EVERY MORNING
COMMUNITY

## 2025-02-10 RX ORDER — PREDNISONE 20 MG/1
20 TABLET ORAL DAILY
Qty: 30 TABLET | Refills: 0 | Status: SHIPPED | OUTPATIENT
Start: 2025-02-10

## 2025-02-10 RX ORDER — OMEPRAZOLE 40 MG/1
1 CAPSULE, DELAYED RELEASE ORAL EVERY MORNING
COMMUNITY
Start: 2024-12-05

## 2025-02-10 RX ORDER — IBUPROFEN 200 MG
TABLET ORAL
COMMUNITY

## 2025-02-10 NOTE — PROGRESS NOTES
Subjective:     Patient ID: Marissa Winn is a 69 y.o. female.    Chief Complaint: Foot Pain (Diabetic pt c/o left foot pain, pt rates 3/10 pain, pt wears slides, PCP Dannielle Muniz last seen 12/5/2024)    Marissa is a 69 y.o. female who presents to the podiatry clinic  with complaint of  left foot pain. Onset of the symptoms was several years ago. Current symptoms include: ability to bear weight, but with some pain. Aggravating factors: walking. Symptoms have been intermittent. Patient has had prior foot problems. Evaluation to date: plain films: abnormal   . Treatment to date: corticosteroid injection which was effective and boot . Patients rates pain 3/10 on pain scale. Patient points to left dorsal midfoot. Patient has no other pedal complaints at this time.    Patient Active Problem List   Diagnosis    Hypertension       Medication List with Changes/Refills   New Medications    PREDNISONE (DELTASONE) 20 MG TABLET    Take 1 tablet (20 mg total) by mouth once daily.   Current Medications    CETIRIZINE (ZYRTEC) 10 MG TABLET    Take 10 mg by mouth once daily.    ECONAZOLE NITRATE 1 % CREAM    AAA of feet bid    FLUOCINOLONE (SYNALAR) 0.01 % EXTERNAL SOLUTION    Use once daily on scalp    GABAPENTIN (NEURONTIN) 100 MG CAPSULE    Take 200 mg by mouth.    HYDROCHLOROTHIAZIDE (HYDRODIURIL) 25 MG TABLET    Take 25 mg by mouth once daily.    IBUPROFEN (ADVIL,MOTRIN) 200 MG TABLET    Advil   prn    IRBESARTAN (AVAPRO) 75 MG TABLET    Take 75 mg by mouth.    JARDIANCE 25 MG TABLET    Take 1 tablet by mouth every morning.    KETOCONAZOLE (NIZORAL) 2 % CREAM    Apply topically 2 (two) times daily. On bottom of feet for two weeks    KETOCONAZOLE (NIZORAL) 2 % SHAMPOO    Wash hair with medicated shampoo at least 1x/week - let sit on scalp at least 5 minutes prior to rinsing    METFORMIN (GLUCOPHAGE) 500 MG TABLET    Take 500 mg by mouth daily with breakfast.    MOUNJARO 2.5 MG/0.5 ML PNIJ    Inject 2.5 mg into the skin.  "   OMEPRAZOLE (PRILOSEC) 40 MG CAPSULE    Take 1 capsule by mouth every morning.    PREDNISONE (DELTASONE) 20 MG TABLET    Take 20 mg by mouth every morning.    VALACYCLOVIR (VALTREX) 500 MG TABLET    Take 500 mg by mouth 2 (two) times daily.       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       History reviewed. No pertinent surgical history.    No family history on file.    Social History     Socioeconomic History    Marital status:    Tobacco Use    Smoking status: Never    Smokeless tobacco: Never     Social Drivers of Health      Received from [facility]    Housing Stability       Vitals:    02/10/25 1026   Weight: 95.3 kg (210 lb 1.6 oz)   Height: 5' 7" (1.702 m)   PainSc:   3       Hemoglobin A1C   Date Value Ref Range Status   12/05/2024 6.8 (H) 4.8 - 5.6 % Final     Comment:              Prediabetes: 5.7 - 6.4           Diabetes: >6.4           Glycemic control for adults with diabetes: <7.0       Review of Systems   Constitutional:  Negative for chills and fever.   Respiratory:  Negative for shortness of breath.    Cardiovascular:  Negative for chest pain, palpitations, orthopnea, claudication and leg swelling.   Gastrointestinal:  Negative for diarrhea, nausea and vomiting.   Musculoskeletal:  Positive for joint pain (left midfoot).   Skin:  Negative for rash.   Neurological:  Negative for dizziness, tingling, sensory change, focal weakness and weakness.   Psychiatric/Behavioral: Negative.           Objective:      PHYSICAL EXAM: Apperance: Alert and orient in no distress,well developed, and with good attention to grooming and body habits  Lower Extremity Exam   VASCULAR: Dorsalis pedis pulses 2/4 bilateral and Posterior Tibial pulses 2/4 bilateral.   DERMATOLOGICAL: No skin rash, subcutaneous nodules, lesions or ulcers observed.   NEUROLOGICAL: Light touch, " sharp-dull, proprioception all present and equal bilaterally.    MUSCULOSKELETAL: Muscle strength 5/5 for all foot inverters, everters, plantarflexors, and  dorsiflexors bilateral. Tenderness on palpation of dorsal left midfoot.     TEST RESULTS: Radiographs of bilateral foot/ankle taken reveals early arthritis to midfoot on the right.         Assessment:       ICD-10-CM ICD-9-CM   1. Osteoarthritis of left ankle and foot  M19.072 715.97       Plan:   Osteoarthritis of left ankle and foot  -     X-Ray Foot Complete Left; Future; Expected date: 02/10/2025  -     predniSONE (DELTASONE) 20 MG tablet; Take 1 tablet (20 mg total) by mouth once daily.  Dispense: 30 tablet; Refill: 0      I counseled the patient on her conditions, regarding findings of my examination, my impressions, and usual treatment plan.   Ordered left foot x-rays to be completed today.   Prescribed Prednisone 20mg to be taken as needed for inflammation. Discussed possible increase in blood sugar with taking steroid medication. Patient advised on the possible elevation of blood pressure sugar and caution to take pills as prescribed and to discontinue use if symptoms arise, patient agreed.  Patient was fitted with lace up ankle brace and instructed on proper usage. This should be worn daily for a minimum of 2 weeks at all times when ambulating.  The patient and I reviewed the types of shoes she should be wearing, my recommendation includes generally the best time of the day for a shoe fitting is the afternoon, shoes with a wide toe box, very good cushion, and tennis shoes with removable inner soles.The patient and I reviewed my recommendations for over-the-counter orthotic inserts.   Patient to return in 2 months or sooner if needed.            Haroldo Gibson DPM  [facility]

## 2025-03-19 ENCOUNTER — OFFICE VISIT (OUTPATIENT)
Dept: PODIATRY | Facility: CLINIC | Age: 70
End: 2025-03-19
Payer: MEDICARE

## 2025-03-19 VITALS — BODY MASS INDEX: 32.98 KG/M2 | HEIGHT: 67 IN | WEIGHT: 210.13 LBS

## 2025-03-19 DIAGNOSIS — M19.072 OSTEOARTHRITIS OF LEFT ANKLE AND FOOT: Primary | ICD-10-CM

## 2025-03-19 PROCEDURE — 99213 OFFICE O/P EST LOW 20 MIN: CPT | Mod: PBBFAC | Performed by: PODIATRIST

## 2025-03-19 PROCEDURE — 99999 PR PBB SHADOW E&M-EST. PATIENT-LVL III: CPT | Mod: PBBFAC,,, | Performed by: PODIATRIST

## 2025-03-19 PROCEDURE — 99212 OFFICE O/P EST SF 10 MIN: CPT | Mod: S$PBB,,, | Performed by: PODIATRIST

## 2025-03-19 RX ORDER — PREDNISONE 20 MG/1
20 TABLET ORAL DAILY
Qty: 90 TABLET | Refills: 0 | Status: SHIPPED | OUTPATIENT
Start: 2025-03-19

## 2025-03-19 NOTE — PROGRESS NOTES
Subjective:     Patient ID: Marissa Winn is a 69 y.o. female.    Chief Complaint: Follow-up and Foot Pain (Left foot pain f/u, pt c/o 0/10 pain, non-diabetic pt wear sandals, PCP Dr. Alfonso last seen 12-5-24)    Marissa is a 69 y.o. female who presents to the podiatry clinic for follow up of left foot pain. Patient states pain is better. Current symptoms include: stiffness. Aggravating factors: walking. Symptoms have been well-controlled. Treatment to date:  previous oral steroid . Patients rates pain 0/10 on pain scale. Patient has no other pedal complaints at this time.     Problem List[1]    Medication List with Changes/Refills   Current Medications    CETIRIZINE (ZYRTEC) 10 MG TABLET    Take 10 mg by mouth once daily.    ECONAZOLE NITRATE 1 % CREAM    AAA of feet bid    FLUOCINOLONE (SYNALAR) 0.01 % EXTERNAL SOLUTION    Use once daily on scalp    GABAPENTIN (NEURONTIN) 100 MG CAPSULE    Take 200 mg by mouth.    HYDROCHLOROTHIAZIDE (HYDRODIURIL) 25 MG TABLET    Take 25 mg by mouth once daily.    IBUPROFEN (ADVIL,MOTRIN) 200 MG TABLET    Advil   prn    IRBESARTAN (AVAPRO) 75 MG TABLET    Take 75 mg by mouth.    JARDIANCE 25 MG TABLET    Take 1 tablet by mouth every morning.    KETOCONAZOLE (NIZORAL) 2 % CREAM    Apply topically 2 (two) times daily. On bottom of feet for two weeks    KETOCONAZOLE (NIZORAL) 2 % SHAMPOO    Wash hair with medicated shampoo at least 1x/week - let sit on scalp at least 5 minutes prior to rinsing    METFORMIN (GLUCOPHAGE) 500 MG TABLET    Take 500 mg by mouth daily with breakfast.    MOUNJARO 2.5 MG/0.5 ML PNIJ    Inject 2.5 mg into the skin.    OMEPRAZOLE (PRILOSEC) 40 MG CAPSULE    Take 1 capsule by mouth every morning.    PREDNISONE (DELTASONE) 20 MG TABLET    Take 20 mg by mouth every morning.    VALACYCLOVIR (VALTREX) 500 MG TABLET    Take 500 mg by mouth 2 (two) times daily.   Changed and/or Refilled Medications    Modified Medication Previous Medication    PREDNISONE (DELTASONE) 20  "MG TABLET predniSONE (DELTASONE) 20 MG tablet       Take 1 tablet (20 mg total) by mouth once daily.    Take 1 tablet (20 mg total) by mouth once daily.       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       History reviewed. No pertinent surgical history.    No family history on file.    Social History[2]    Vitals:    03/19/25 0958   Weight: 95.3 kg (210 lb 1.6 oz)   Height: 5' 7" (1.702 m)   PainSc: 0-No pain       Review of Systems   Constitutional:  Negative for chills and fever.   Respiratory:  Negative for shortness of breath.    Cardiovascular:  Negative for chest pain, palpitations, orthopnea, claudication and leg swelling.   Gastrointestinal:  Negative for diarrhea, nausea and vomiting.   Musculoskeletal:  Negative for joint pain.   Skin:  Negative for rash.   Neurological:  Negative for dizziness, tingling, sensory change, focal weakness and weakness.   Psychiatric/Behavioral: Negative.             Objective:      PHYSICAL EXAM: Apperance: Alert and orient in no distress,well developed, and with good attention to grooming and body habits  Lower Extremity Exam   VASCULAR: Dorsalis pedis pulses 2/4 bilateral and Posterior Tibial pulses 2/4 bilateral.   DERMATOLOGICAL: No skin rash, subcutaneous nodules, lesions or ulcers observed.   NEUROLOGICAL: Light touch, sharp-dull, proprioception all present and equal bilaterally.    MUSCULOSKELETAL: Muscle strength 5/5 for all foot inverters, everters, plantarflexors, and  dorsiflexors bilateral. No tenderness on palpation of dorsal left midfoot.     TEST RESULTS: Radiographs of bilateral foot/ankle taken reveals early arthritis to midfoot on the right.         Assessment:       ICD-10-CM ICD-9-CM   1. Osteoarthritis of left ankle and foot  M19.072 715.97       Plan:   Osteoarthritis of left ankle and foot  -     predniSONE (DELTASONE) 20 MG " tablet; Take 1 tablet (20 mg total) by mouth once daily.  Dispense: 90 tablet; Refill: 0      I counseled the patient on her conditions, regarding findings of my examination, my impressions, and usual treatment plan.   Reviewed left foot x-rays to be completed last visit.   Prescribed Prednisone 20mg to be taken as needed for inflammation. Discussed possible increase in blood sugar with taking steroid medication. Patient advised on the possible elevation of blood pressure sugar and caution to take pills as prescribed and to discontinue use if symptoms arise, patient agreed.  Patient was fitted with lace up ankle brace and instructed on proper usage. This should be worn daily for a minimum of 2 weeks at all times when ambulating.  The patient and I reviewed the types of shoes she should be wearing, my recommendation includes generally the best time of the day for a shoe fitting is the afternoon, shoes with a wide toe box, very good cushion, and tennis shoes with removable inner soles.The patient and I reviewed my recommendations for over-the-counter orthotic inserts.   Patient to return in 3 months or sooner if needed.         Sharnette Miles, DPM [facility]            [1]   Patient Active Problem List  Diagnosis    Hypertension   [2]   Social History  Socioeconomic History    Marital status:    Tobacco Use    Smoking status: Never    Smokeless tobacco: Never     Social Drivers of Health      Received from [facility]

## 2025-04-02 ENCOUNTER — OFFICE VISIT (OUTPATIENT)
Dept: INTERNAL MEDICINE | Facility: CLINIC | Age: 70
End: 2025-04-02
Payer: MEDICARE

## 2025-04-02 VITALS
RESPIRATION RATE: 18 BRPM | TEMPERATURE: 98 F | HEIGHT: 67 IN | HEART RATE: 110 BPM | DIASTOLIC BLOOD PRESSURE: 78 MMHG | OXYGEN SATURATION: 97 % | WEIGHT: 193.31 LBS | BODY MASS INDEX: 30.34 KG/M2 | SYSTOLIC BLOOD PRESSURE: 116 MMHG

## 2025-04-02 DIAGNOSIS — M54.6 ACUTE RIGHT-SIDED THORACIC BACK PAIN: ICD-10-CM

## 2025-04-02 DIAGNOSIS — M25.511 ACUTE PAIN OF RIGHT SHOULDER: Primary | ICD-10-CM

## 2025-04-02 PROCEDURE — 96372 THER/PROPH/DIAG INJ SC/IM: CPT | Mod: PBBFAC,PN

## 2025-04-02 PROCEDURE — 99214 OFFICE O/P EST MOD 30 MIN: CPT | Mod: S$PBB,,, | Performed by: NURSE PRACTITIONER

## 2025-04-02 PROCEDURE — 99999PBSHW PR PBB SHADOW TECHNICAL ONLY FILED TO HB: Mod: PBBFAC,,,

## 2025-04-02 PROCEDURE — 99214 OFFICE O/P EST MOD 30 MIN: CPT | Mod: PBBFAC,PN,25 | Performed by: NURSE PRACTITIONER

## 2025-04-02 PROCEDURE — 99999 PR PBB SHADOW E&M-EST. PATIENT-LVL IV: CPT | Mod: PBBFAC,,, | Performed by: NURSE PRACTITIONER

## 2025-04-02 RX ORDER — CYCLOBENZAPRINE HCL 5 MG
5 TABLET ORAL 2 TIMES DAILY PRN
COMMUNITY
Start: 2025-04-01 | End: 2025-05-01

## 2025-04-02 RX ORDER — BETAMETHASONE SODIUM PHOSPHATE AND BETAMETHASONE ACETATE 3; 3 MG/ML; MG/ML
6 INJECTION, SUSPENSION INTRA-ARTICULAR; INTRALESIONAL; INTRAMUSCULAR; SOFT TISSUE
Status: COMPLETED | OUTPATIENT
Start: 2025-04-02 | End: 2025-04-02

## 2025-04-02 RX ADMIN — BETAMETHASONE ACETATE AND BETAMETHASONE SODIUM PHOSPHATE 6 MG: 3; 3 INJECTION, SUSPENSION INTRA-ARTICULAR; INTRALESIONAL; INTRAMUSCULAR; SOFT TISSUE at 04:04

## 2025-04-02 NOTE — PROGRESS NOTES
Patient ID: Marissa Winn is a 69 y.o. female.    Chief Complaint: Shoulder Pain (Rt)    History of Present Illness    CHIEF COMPLAINT:  Ms. Winn presents today with right shoulder and thoracic back pain    MUSCULOSKELETAL:  She reports right shoulder and thoracic pain rated 8/10 in severity without history of injury or trauma. She tried PREDNISONE 20 mg for osteoarthritis without improvement. She has a concurrent left hamstring strain under orthopedic care.    CURRENT MEDICAL CARE:  She is under care of PCP Dr. Mattson.    MEDICATIONS:  She was recently prescribed cyclobenzaprine and plans to initiate therapy.      ROS:  Constitutional: negative diminished activity, negative appetite change, negative fatigue, negative fever  HENT: negative ear discharge, negative ear pain, negative mouth sores, negative nosebleeds, negative sore throat, negative tinnitus  Respiratory: negative apnea, negative cough, negative shortness of breath  Cardiovascular: negative chest pain, negative leg swelling  Gastrointestinal: negative abdominal distention, negative abdominal pain, negative blood in stool, negative constipation, negative diarrhea, negative nausea, negative vomiting  Genitourinary: negative difficulty urinating, negative flank pain, negative frequency, negative hematuria  Musculoskeletal: negative back pain, negative abnormal gait, negative neck pain, negative neck stiffness, POSITIVE JOINT PAIN, negative muscle pain, POSITIVE PAIN WITH MOVEMENT  Neurological: negative dizziness, negative seizures, negative numbness, negative tingling, negative headaches, negative balance issues  Psychiatric: negative agitation, negative confusion, negative hallucinations, negative sleep difficulty, negative suicidal ideation         Physical Exam    Vital Signs: Reviewed.  Constitutional: Well-appearing. Well-developed. No acute distress.  Cardiovascular: Normal rate and regular rhythm. Normal heart sounds.  Pulmonary: Pulmonary effort  is normal. Normal breath sounds.  Skin: Warm. Dry.  Neurological: No focal deficit is present. Alert and oriented to person, place, and time.  Psychiatric: Mood is normal. Behavior is normal. Behavior is cooperative.  MSK: Shoulder - Right: Stable range of motion to the right upper arm.         Assessment & Plan        RIGHT SHOULDER PAIN:  - Evaluated the patient's right shoulder pain, rated 8/10.  - Noted stable range of motion in the right upper arm.  - Confirmed absence of reported injury or trauma.  - Discussed potential side effects associated with steroid injections.  - Noted previous unsuccessful treatments with cyclobenzaprine and oral cortisone.  - Administered Celestone 1 mL injection for pain management.  - Discontinued oral prednisone.  - Recommend follow-up with PCP or orthopedist if no improvement.    THORACIC PAIN-RIGHT:  - Evaluated the patient's posterior right side back pain, rated 8/10.  - Identified right thoracic pain in the affected area.  - Recommend follow-up with PCP or orthopedist.           No follow-ups on file.    This note was generated with the assistance of ambient listening technology. Verbal consent was obtained by the patient and accompanying visitor(s) for the recording of patient appointment to facilitate this note. I attest to having reviewed and edited the generated note for accuracy, though some syntax or spelling errors may persist. Please contact the author of this note for any clarification.

## 2025-06-19 ENCOUNTER — OFFICE VISIT (OUTPATIENT)
Dept: PODIATRY | Facility: CLINIC | Age: 70
End: 2025-06-19
Payer: MEDICARE

## 2025-06-19 VITALS — BODY MASS INDEX: 30.34 KG/M2 | HEIGHT: 67 IN | WEIGHT: 193.31 LBS

## 2025-06-19 DIAGNOSIS — L60.9 DISEASE OF NAIL: Primary | ICD-10-CM

## 2025-06-19 DIAGNOSIS — L60.0 ONYCHOCRYPTOSIS: ICD-10-CM

## 2025-06-19 PROCEDURE — 87101 SKIN FUNGI CULTURE: CPT | Performed by: PODIATRIST

## 2025-06-19 PROCEDURE — 99213 OFFICE O/P EST LOW 20 MIN: CPT | Mod: PBBFAC | Performed by: PODIATRIST

## 2025-06-19 PROCEDURE — 99999 PR PBB SHADOW E&M-EST. PATIENT-LVL III: CPT | Mod: PBBFAC,,, | Performed by: PODIATRIST

## 2025-06-19 PROCEDURE — 99213 OFFICE O/P EST LOW 20 MIN: CPT | Mod: S$PBB,,, | Performed by: PODIATRIST

## 2025-06-19 NOTE — PROGRESS NOTES
Subjective:     Patient ID: Marissa Winn is a 70 y.o. female.    Chief Complaint: Ingrown Toenail (Non-diabetic pt c/o left medial side hallux, pt wears slippers, PCP Dannielle Muniz last seen 6/9/2025)    Marissa is a 70 y.o. female who presents to the clinic complaining of thick and discolored toenails on the left foot. Marissa is inquiring about treatment options. Patient states left toenail is tender in the left (medial) border and there is thick material, skin at the tip of the left bog toenail. Patient has no other pedal complaints at this time.     Problem List[1]    Medication List with Changes/Refills   Current Medications    CETIRIZINE (ZYRTEC) 10 MG TABLET    Take 10 mg by mouth once daily.    ECONAZOLE NITRATE 1 % CREAM    AAA of feet bid    FLUOCINOLONE (SYNALAR) 0.01 % EXTERNAL SOLUTION    Use once daily on scalp    GABAPENTIN (NEURONTIN) 100 MG CAPSULE    Take 200 mg by mouth.    HYDROCHLOROTHIAZIDE (HYDRODIURIL) 25 MG TABLET    Take 25 mg by mouth once daily.    IBUPROFEN (ADVIL,MOTRIN) 200 MG TABLET    Advil   prn    IRBESARTAN (AVAPRO) 75 MG TABLET    Take 75 mg by mouth.    JARDIANCE 25 MG TABLET    Take 1 tablet by mouth every morning.    KETOCONAZOLE (NIZORAL) 2 % CREAM    Apply topically 2 (two) times daily. On bottom of feet for two weeks    KETOCONAZOLE (NIZORAL) 2 % SHAMPOO    Wash hair with medicated shampoo at least 1x/week - let sit on scalp at least 5 minutes prior to rinsing    METFORMIN (GLUCOPHAGE) 500 MG TABLET    Take 500 mg by mouth daily with breakfast.    MOUNJARO 2.5 MG/0.5 ML PNIJ    Inject 2.5 mg into the skin.    OMEPRAZOLE (PRILOSEC) 40 MG CAPSULE    Take 1 capsule by mouth every morning.    PREDNISONE (DELTASONE) 20 MG TABLET    Take 1 tablet (20 mg total) by mouth once daily.    VALACYCLOVIR (VALTREX) 500 MG TABLET    Take 500 mg by mouth as needed.       Review of patient's allergies indicates:   Allergen Reactions    Statins-hmg-coa reductase inhibitors      Pt  "states she an not tolerate any of them. She is a pharmacist.       History reviewed. No pertinent surgical history.    No family history on file.    Social History[2]    Vitals:    06/19/25 0925   Weight: 87.7 kg (193 lb 5.5 oz)   Height: 5' 7" (1.702 m)   PainSc: 0-No pain       Hemoglobin A1C   Date Value Ref Range Status   06/09/2025 6.6 (H) 4.8 - 5.6 % Final     Comment:              Prediabetes: 5.7 - 6.4           Diabetes: >6.4           Glycemic control for adults with diabetes: <7.0   12/05/2024 6.8 (H) 4.8 - 5.6 % Final     Comment:              Prediabetes: 5.7 - 6.4           Diabetes: >6.4           Glycemic control for adults with diabetes: <7.0       Review of Systems   Constitutional:  Negative for chills and fever.   Respiratory:  Negative for shortness of breath.    Cardiovascular:  Negative for chest pain, palpitations, orthopnea, claudication and leg swelling.   Gastrointestinal:  Negative for diarrhea, nausea and vomiting.   Musculoskeletal:  Negative for joint pain.   Skin:  Negative for rash.   Neurological:  Negative for dizziness, tingling, sensory change, focal weakness and weakness.   Psychiatric/Behavioral: Negative.               Objective:      PHYSICAL EXAM: Apperance: Alert and orient in no distress,well developed, and with good attention to grooming and body habits  Lower Extremity Exam   VASCULAR: Dorsalis pedis pulses 2/4 bilateral and Posterior Tibial pulses 2/4 bilateral.   DERMATOLOGICAL: No skin rash, subcutaneous nodules, lesions or ulcers observed. Left hallux distal nail thickened, and discolored with subungual debris. Left hallux nail observed to be mildly incurvated at distal medial borders and slight obstructed in the nail grooves with soft tissue. No purulent drainage, no odor, and no increased temperature observed to left hallux.    NEUROLOGICAL: Light touch, sharp-dull, proprioception all present and equal bilaterally.    MUSCULOSKELETAL: Muscle strength 5/5 for all " foot inverters, everters, plantarflexors, and  dorsiflexors bilateral. No pain on palpation of left hallux distal medial nail border. No pain on palpation of dorsal nail plate left hallux.           Assessment:       ICD-10-CM ICD-9-CM   1. Disease of nail - Left Foot  L60.9 703.9   2. Onychocryptosis - Left Foot  L60.0 703.0       Plan:   Disease of nail - Left Foot  -     Fungal culture , skin, hair, or nails    Onychocryptosis - Left Foot      I counseled the patient on her conditions, regarding findings of my examination, my impressions, and usual treatment plan.   Patient instructed to spray all shoes with Lysol disinfectant spray and let dry before wearing. Patient instructed to wash all socks in hot water and bleach.  Discuss treatment options for nail fungus.  I explained that fungus lives in a warm dark moist environment and therefore patient should make every attempt to keep feet clean and dry.  We discussed drying feet thoroughly after shower particularly between the toes and then applying powder between the toes and in the shoes.    For fungal toenails I prescribed topical medication to be used daily for up to a year.  We also discussed oral Lamisil but I did not recommend it as a first line of treatment since it is an internal medicine that may potentially have side effects, including liver problems. Patient elects for topical treatment.   With patient's permission, nail clippings obtained for fungal nail culture.   Patient to return pending nail culture results.        Haroldo Gibson DPM  [facility]        [1]   Patient Active Problem List  Diagnosis    Hypertension   [2]   Social History  Socioeconomic History    Marital status:    Tobacco Use    Smoking status: Never     Passive exposure: Never    Smokeless tobacco: Never   Substance and Sexual Activity    Alcohol use: Never    Drug use: Never    Sexual activity: Yes     Partners: Male     Social Drivers of Health     Financial  Resource Strain: Low Risk  (4/12/2025)    [facility]    Overall Financial Resource Strain (CARDIA)     Difficulty of Paying Living Expenses: Not hard at all   Food Insecurity: No Food Insecurity (4/12/2025)    [facility]    Hunger Vital Sign     Worried About Running Out of Food in the Last Year: Never true     Ran Out of Food in the Last Year: Never true   Transportation Needs: No Transportation Needs (4/12/2025)    [facility]    PRAPARE - Transportation     Lack of Transportation (Medical): No     Lack of Transportation (Non-Medical): No   Physical Activity: Inactive (4/12/2025)    [facility]    Exercise Vital Sign     Days of Exercise per Week: 0 days     Minutes of Exercise per Session: 0 min   Stress: No Stress Concern Present (4/12/2025)    Received from [facility]    [employer] - Occupational Stress Questionnaire     Feeling of Stress : Not at all   Housing Stability: Unknown (4/12/2025)    [facility]    Housing Stability Vital Sign     Unable to Pay for Housing in the Last Year: No     Homeless in the Last Year: No

## 2025-09-02 ENCOUNTER — TELEPHONE (OUTPATIENT)
Dept: DERMATOLOGY | Facility: CLINIC | Age: 70
End: 2025-09-02
Payer: MEDICARE